# Patient Record
Sex: FEMALE | Race: WHITE | NOT HISPANIC OR LATINO | Employment: STUDENT | URBAN - NONMETROPOLITAN AREA
[De-identification: names, ages, dates, MRNs, and addresses within clinical notes are randomized per-mention and may not be internally consistent; named-entity substitution may affect disease eponyms.]

---
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
## 2022-01-22 ENCOUNTER — HEALTH MAINTENANCE LETTER (OUTPATIENT)
Age: 22
End: 2022-01-22

## 2022-02-17 PROBLEM — K21.9 GASTROESOPHAGEAL REFLUX DISEASE: Status: ACTIVE | Noted: 2020-05-07

## 2022-03-12 NOTE — PATIENT INSTRUCTIONS
"TIPS TO IMPROVE SLEEP    1. Provide a comfortable sleep setting   The bedroom should be comfortable (not too hot or cold), quiet, and dark (although a night light may help children who are afraid of the dark). Cooler (not cold) temperatures encourage quality sleep.    2. Establish a regular bedtime routine   A regular routine that is short and predictable will help to \"set the mood\" that it is now time to sleep. The routine should include calming, quiet activities such as a warm bath, brushing teeth, and reading. Avoid activities such as running, jumping, or rough housing. Avoid exciting movies/games/computers, loud music, or bright lights. The routine should take no more than 30-60 minutes (depending on age). A routine is helpful for all ages, infant to adult.     An example of a bedtime routine:   - Put on pajamas   - Use the toilet   - Wash hands   - Brush teeth   - Drink water   - Read a story/book   - Lie down in bed   - Sleep    *The more regular the routine, the easier it will be to settle at night*    3. Keep a regular schedule of sleep/wake times   Try to keep the same sleep/wake times throughout the week. Try not to sleep in more than an hour later than usual on weekends, to avoid resetting the internal body clock. If it is consistently taking longer than an hour to fall asleep, try moving bedtime later by 30-60 minutes.     4. Avoid heavy meals close to bedtime   A light snack may help with falling asleep, such as cheese and crackers, or herbal tea such as chamomile. Some people are bothered by any food close to bedtime, so avoid any food or drink except for water if this is the case.    5. Keep the bedroom dark at bedtime and light when waking   This helps the body's internal clock to realize when it is time to sleep and time to wake. Use room-darkening shades in the summer at bedtime and turn on the bedroom lights in the winter in the morning.    6. Get exercise daily   Get vigorous exercise early in " "the day (at least 2-3 hours prior to bedtime). Exercise has been shown to make it easier to fall asleep at night and to have deeper sleep. Relaxing activities such as yoga or guided meditation may be done closer to bedtime and may help sleep.    7. Avoid caffeine in the afternoon and evening   Caffeine stays in the system for 3-12 hours. Caffeine may be found in coffee, black/green tea, soda pop, energy drinks, and chocolate. Likewise, avoid high-sugar snacks prior to bed time as the sugar may increase energy.    8. Avoid screens (television, computer, phone, tablet etc) before bed   The light from the screens can be too visually stimulating, even on night shift mode. Playing games or watching movies can be too stimulating for the brain. Turn off all electronics at least 1 hour prior to bedtime.    *Turn all clocks so they are facing away from the bed to avoid clock-watching*    9. Additional therapies for sleep   If it is still difficult to fall asleep, try some of the following strategies:   - Lavender essential oil   - Progressive relaxation exercises   - Counting backwards from 100. If too easy, try counting backwards by 7s or 3s.   - Slow, deep breathing (in for a count of 5 and out for a count of 5)    10. The bed should be a place for sleep and rest only   Especially for older children, if unable to fall asleep after 15-30 minutes, get out of bed and engage in a quiet activity such as reading or meditation. Tossing and turning in bed \"trains\" the body and mind that the bed is a place for tossing and turning, not just sleep.    11. Avoid medication, except as a last resort after all other non-medication therapies have been tried   Any sleep strategy may take a few weeks to work. If sleep is not improving after 2-3 weeks, keep a sleep diary (noting sleep times, wake times, sleep quality, exercise patterns, and food/drink intake) for at least 1-2 weeks and follow up in the clinic.    " Admission

## 2022-03-13 DIAGNOSIS — F41.1 GAD (GENERALIZED ANXIETY DISORDER): ICD-10-CM

## 2022-03-14 DIAGNOSIS — T78.2XXD ANAPHYLACTIC REACTION, SUBSEQUENT ENCOUNTER: ICD-10-CM

## 2022-03-14 RX ORDER — EPINEPHRINE 0.3 MG/.3ML
INJECTION SUBCUTANEOUS
Qty: 2 EACH | Refills: 0 | Status: SHIPPED | OUTPATIENT
Start: 2022-03-14 | End: 2023-11-29

## 2022-03-14 NOTE — TELEPHONE ENCOUNTER
Mother calling and pt is leaving tomorrow out of states.She needs this refilled today.Her pen is .    Epi pen     Last Written Prescription Date:  2020  Last Fill Quantity: 1,   # refills: 0  Last Office Visit: 2021  Future Office visit:       Routing refill request to provider for review/approval because:      Anaya Gonzalez RN

## 2022-04-24 DIAGNOSIS — E28.2 PCOS (POLYCYSTIC OVARIAN SYNDROME): ICD-10-CM

## 2022-04-24 DIAGNOSIS — Z30.41 ENCOUNTER FOR SURVEILLANCE OF CONTRACEPTIVE PILLS: ICD-10-CM

## 2022-04-27 RX ORDER — DROSPIRENONE AND ETHINYL ESTRADIOL 0.03MG-3MG
1 KIT ORAL DAILY
Qty: 84 TABLET | Refills: 3 | Status: SHIPPED | OUTPATIENT
Start: 2022-04-27 | End: 2023-05-08

## 2022-04-27 NOTE — TELEPHONE ENCOUNTER
lov 08/30/2021  drospirenone-ethinyl estradiol (LEATHA) 3-0.03 MG tablet 84 tablet 3 6/29/2021

## 2022-05-04 ENCOUNTER — LAB (OUTPATIENT)
Dept: FAMILY MEDICINE | Facility: OTHER | Age: 22
End: 2022-05-04
Attending: FAMILY MEDICINE
Payer: COMMERCIAL

## 2022-05-04 DIAGNOSIS — Z20.822 SUSPECTED COVID-19 VIRUS INFECTION: ICD-10-CM

## 2022-05-04 LAB — SARS-COV-2 RNA RESP QL NAA+PROBE: NEGATIVE

## 2022-05-04 PROCEDURE — U0003 INFECTIOUS AGENT DETECTION BY NUCLEIC ACID (DNA OR RNA); SEVERE ACUTE RESPIRATORY SYNDROME CORONAVIRUS 2 (SARS-COV-2) (CORONAVIRUS DISEASE [COVID-19]), AMPLIFIED PROBE TECHNIQUE, MAKING USE OF HIGH THROUGHPUT TECHNOLOGIES AS DESCRIBED BY CMS-2020-01-R: HCPCS

## 2022-05-04 PROCEDURE — U0003 INFECTIOUS AGENT DETECTION BY NUCLEIC ACID (DNA OR RNA); SEVERE ACUTE RESPIRATORY SYNDROME CORONAVIRUS 2 (SARS-COV-2) (CORONAVIRUS DISEASE [COVID-19]), AMPLIFIED PROBE TECHNIQUE, MAKING USE OF HIGH THROUGHPUT TECHNOLOGIES AS DESCRIBED BY CMS-2020-01-R: HCPCS | Mod: ZL

## 2022-05-04 PROCEDURE — U0005 INFEC AGEN DETEC AMPLI PROBE: HCPCS

## 2022-05-07 ENCOUNTER — HOSPITAL ENCOUNTER (EMERGENCY)
Facility: HOSPITAL | Age: 22
Discharge: HOME | End: 2022-05-07
Attending: PHYSICIAN ASSISTANT | Admitting: PHYSICIAN ASSISTANT
Payer: COMMERCIAL

## 2022-05-07 VITALS
OXYGEN SATURATION: 98 % | HEART RATE: 112 BPM | RESPIRATION RATE: 16 BRPM | TEMPERATURE: 97.7 F | DIASTOLIC BLOOD PRESSURE: 78 MMHG | SYSTOLIC BLOOD PRESSURE: 113 MMHG

## 2022-05-07 DIAGNOSIS — H69.92 DYSFUNCTION OF LEFT EUSTACHIAN TUBE: ICD-10-CM

## 2022-05-07 DIAGNOSIS — J01.40 ACUTE PANSINUSITIS, RECURRENCE NOT SPECIFIED: ICD-10-CM

## 2022-05-07 DIAGNOSIS — F41.1 GAD (GENERALIZED ANXIETY DISORDER): ICD-10-CM

## 2022-05-07 LAB
FLUAV RNA SPEC QL NAA+PROBE: NEGATIVE
FLUBV RNA RESP QL NAA+PROBE: NEGATIVE
RSV RNA SPEC NAA+PROBE: NEGATIVE
SARS-COV-2 RNA RESP QL NAA+PROBE: NEGATIVE

## 2022-05-07 PROCEDURE — G0463 HOSPITAL OUTPT CLINIC VISIT: HCPCS

## 2022-05-07 PROCEDURE — 87637 SARSCOV2&INF A&B&RSV AMP PRB: CPT | Performed by: PHYSICIAN ASSISTANT

## 2022-05-07 PROCEDURE — 99213 OFFICE O/P EST LOW 20 MIN: CPT | Performed by: PHYSICIAN ASSISTANT

## 2022-05-07 RX ORDER — AZITHROMYCIN 250 MG/1
TABLET, FILM COATED ORAL
Qty: 6 TABLET | Refills: 0 | Status: SHIPPED | OUTPATIENT
Start: 2022-05-07 | End: 2022-05-12

## 2022-05-07 RX ORDER — PREDNISONE 20 MG/1
TABLET ORAL
Qty: 10 TABLET | Refills: 0 | Status: SHIPPED | OUTPATIENT
Start: 2022-05-07 | End: 2022-08-19

## 2022-05-07 ASSESSMENT — ENCOUNTER SYMPTOMS
FEVER: 1
FACIAL SWELLING: 0
SINUS PRESSURE: 1
MYALGIAS: 1
HEADACHES: 1
FATIGUE: 1

## 2022-05-07 NOTE — ED PROVIDER NOTES
History     Chief Complaint   Patient presents with     URI     HPI  Baudilio Pereyra is a 21 year old female who presents on day 4 URI symptoms. She reports facial pressure, post nasal drainage and fatigue are most severe. Slight dry cough with no wheezing. She has been using neti pot, OTC sinus/cold medication with no overall improvement. Multiplex obtained, but is pending. No known ill contacts, but is a student.     Allergies:  Allergies   Allergen Reactions     Pcn [Penicillins]      Peanuts [Nuts]      Seasonal Allergies      Seasonal allergies, dogs, cats, various molds, dust mites, birch trees     Nickel Rash       Problem List:    Patient Active Problem List    Diagnosis Date Noted     Irritable bowel syndrome with constipation 05/07/2020     Priority: Medium     Nausea 05/07/2020     Priority: Medium     Gastroesophageal reflux disease, esophagitis presence not specified 05/07/2020     Priority: Medium     IMO Regulatory Load OCT 2020       Slow transit constipation 02/12/2020     Priority: Medium     Dysuria 02/12/2020     Priority: Medium     Autonomic dysfunction 01/13/2020     Priority: Medium     Hypovitaminosis D 01/13/2020     Priority: Medium     Mood changes 01/13/2020     Priority: Medium     Orthostatic dizziness 10/18/2019     Priority: Medium     Vision changes 10/18/2019     Priority: Medium     Macular rash 04/15/2019     Priority: Medium     Vasovagal syncope 04/15/2019     Priority: Medium     Elevated C-reactive protein (CRP) 04/08/2019     Priority: Medium     Dizziness 04/08/2019     Priority: Medium     Weight gain 06/13/2018     Priority: Medium     Persistent disorder of initiating or maintaining sleep 06/13/2018     Priority: Medium     Chronic fatigue 06/13/2018     Priority: Medium     Patellofemoral disorder of right knee 04/14/2018     Priority: Medium     JAYME (generalized anxiety disorder) 11/21/2016     Priority: Medium     Other acne 11/21/2016     Priority: Medium     PCOS  (polycystic ovarian syndrome) 07/15/2016     Priority: Medium     Mild single current episode of major depressive disorder (H) 06/20/2016     Priority: Medium        Past Medical History:    Past Medical History:   Diagnosis Date     Allergic reaction      Dysmenorrhea 11/21/2016     Fatigue, unspecified type 6/13/2018     JAYME (generalized anxiety disorder)      Patellofemoral disorder of right knee 4/14/2018     PCOS (polycystic ovarian syndrome)      Persistent disorder of initiating or maintaining sleep 6/13/2018     Weight gain 6/13/2018       Past Surgical History:    Past Surgical History:   Procedure Laterality Date     ADENOIDECTOMY       TONSILLECTOMY         Family History:    Family History   Problem Relation Age of Onset     Diabetes Father        Social History:  Marital Status:  Single [1]  Social History     Tobacco Use     Smoking status: Never Smoker     Smokeless tobacco: Never Used   Substance Use Topics     Alcohol use: No     Drug use: No        Medications:    azithromycin (ZITHROMAX Z-ANSELMO) 250 MG tablet  diphenhydrAMINE (BENADRYL) 50 MG capsule  drospirenone-ethinyl estradiol (LEATHA) 3-0.03 MG tablet  EPINEPHrine (ANY BX GENERIC EQUIV) 0.3 MG/0.3ML injection 2-pack  IBUPROFEN PO  lubiprostone (AMITIZA) 24 MCG capsule  multivitamin w/minerals (THERA-VIT-M) tablet  predniSONE (DELTASONE) 20 MG tablet  sertraline (ZOLOFT) 50 MG tablet          Review of Systems   Constitutional: Positive for fatigue and fever.   HENT: Positive for congestion, ear pain, postnasal drip and sinus pressure. Negative for facial swelling.    Musculoskeletal: Positive for myalgias.   Neurological: Positive for headaches.       Physical Exam   BP: 113/78  Pulse: 112  Temp: 97.7  F (36.5  C)  Resp: 16  SpO2: 98 %      Physical Exam  Vitals and nursing note reviewed.   Constitutional:       General: She is not in acute distress.     Appearance: She is not toxic-appearing.   HENT:      Right Ear: Tympanic membrane normal.       Left Ear: A middle ear effusion is present. Tympanic membrane is injected.      Mouth/Throat:      Mouth: Mucous membranes are moist.      Pharynx: No oropharyngeal exudate.   Eyes:      Conjunctiva/sclera: Conjunctivae normal.   Cardiovascular:      Rate and Rhythm: Normal rate and regular rhythm.      Heart sounds: No murmur heard.  Pulmonary:      Effort: Pulmonary effort is normal.      Breath sounds: Normal breath sounds. No wheezing or rales.   Lymphadenopathy:      Cervical: No cervical adenopathy (palpable tender anterior cervical nodes, < 1cm).   Neurological:      Mental Status: She is alert.         ED Course         Assessments & Plan (with Medical Decision Making)     I have reviewed the nursing notes.  I have reviewed the findings, diagnosis, plan and need for follow up with the patient.    Discharge Medication List as of 5/7/2022 10:14 AM      START taking these medications    Details   azithromycin (ZITHROMAX Z-ANSELMO) 250 MG tablet Two tablets on the first day, then one tablet daily for the next 4 days, Disp-6 tablet, R-0, Local Print      predniSONE (DELTASONE) 20 MG tablet Take two tablets (= 40mg) each day for 5 (five) days, Disp-10 tablet, R-0, E-Prescribe             Final diagnoses:   Acute pansinusitis, recurrence not specified   Dysfunction of left eustachian tube   Will treat with prednisone as above and watch/wait with antibiotics for OM. Continue sinus flush, gargles and fluids. Follow up with Primary Care Provider in 3-5 days with poor improvement. Seek attention sooner with worsening despite treatment.      5/7/2022   [facility]     David Israel PA  05/07/22 [id number]

## 2022-05-07 NOTE — DISCHARGE INSTRUCTIONS
1. Dry out congestion/swelling/secretions with prednisone (2 tabs now, then in AM with food)   2. Use a saline spray/Neti Pot/sinus flush (Messi Med Sinus Rinse) 2-3 times daily to irrigate sinuses/mucosal tissue. This dilutes and moves secretions.   3. Tylenol or ibuprofen for pain and fevers as needed.   4. Plenty of fluids and rest as needed.   5. Chew, yawn and speak to help eustachian tubes drain.   * If ear pain worsening, may start Zpack.   - Consider the following over-the-counter products if you are older than 1 year and not pregnant: honey/chestal for cough relief and sambucus/elderberry for viral upper-respiratory symptoms.

## 2022-05-18 RX ORDER — AZITHROMYCIN 250 MG/1
TABLET, FILM COATED ORAL
Qty: 6 TABLET | Refills: 0 | Status: SHIPPED | OUTPATIENT
Start: 2022-05-18 | End: 2022-05-23

## 2022-07-12 ENCOUNTER — TRANSFERRED RECORDS (OUTPATIENT)
Dept: HEALTH INFORMATION MANAGEMENT | Facility: HOSPITAL | Age: 22
End: 2022-07-12

## 2022-07-12 LAB
C TRACH DNA SPEC QL PROBE+SIG AMP: NOT DETECTED
HEP C HIM: NORMAL
HIV 1&2 EXT: NORMAL
SPECIMEN DESCRIPTION: NORMAL

## 2022-08-01 DIAGNOSIS — F41.1 GAD (GENERALIZED ANXIETY DISORDER): ICD-10-CM

## 2022-08-02 NOTE — TELEPHONE ENCOUNTER
zoloft      Last Written Prescription Date:  5/7/22  Last Fill Quantity: 30,   # refills: 0  Last Office Visit: 4/15/21  Future Office visit:

## 2022-08-17 ENCOUNTER — TELEPHONE (OUTPATIENT)
Dept: FAMILY MEDICINE | Facility: OTHER | Age: 22
End: 2022-08-17

## 2022-08-17 NOTE — TELEPHONE ENCOUNTER
"Call placed to patient at [phone] as provided by patient when requesting a return call from Triage.     Voicemail message left requesting a return call to discuss positive covid 19, Triage # provided, awaiting return call.     Patient testing positive on 8/16/22 via at home covid test.    Symptoms to include:      Start of Symptoms:     Covid 19 Vaccination Status:      Underlying Medical Conditions: Anxiety     Appointment scheduled:     Instructions below provided to patient. Patient verbalized understanding.     Instructions for Patients  Your COVID-19 test was positive. This means you have the virus. Please follow the \"How can I take care of myself\" and \"How do I self-isolate?\" guidelines in these instructions.    What treatments are available?  Over-the-counter medicines may help with your symptoms such as runny or stuffy nose, cough, chills, and fever. Talk to your care team about your options.     Some people are at high risk for severe illness (for example if you have a weak immune system, you're 65 or older, or you have certain medical problems). If your risk it high and your symptoms started in the last 5 to 7 days, we strongly recommend for you to get COVID treatment as soon as possible before you get really sick. Paxlovid, Molnupiravir and the monoclonal antibody treatments are proven safe and effective, make you feel better faster, and prevent hospitalization and death.       What are the symptoms of COVID-19?  Symptoms can include fever, cough, shortness of breath, chills, headache, muscle pain sore throat, fatigue, runny or stuffy nose, and loss of taste and smell. Other less common symptoms include nausea, vomiting, or diarrhea (watery stools).    Know when to call 911. Emergency warning signs include:    Trouble breathing or shortness of breath    Pain or pressure in the chest that doesn't go away    Feeling confused like you haven't felt before, or not being able to wake up    Bluish-colored " lips or face    How can I take care of myself?  1. Get lots of rest. Drink extra fluids (unless a doctor has told you not to).  2. Take Tylenol (acetaminophen) for fever or pain. If you have liver or kidney problems, ask your family doctor if it's okay to take Tylenol   Adults:   650 mg (two 325 mg pills or tablets) every 4 to 6 hours, or...   1,000 mg (two 500 mg pills or tablets) every 8 hours as needed.  Note: Don't take more than 3,000 mg in one day. Acetaminophen is found in many medicines (both prescribed and over the counter). Read all labels to be sure you don't take too much.  For children, check the Tylenol bottle for the right dose. The dose is based on the child's age or weight.  3. Take over the counter medicines for your symptoms as needed. Talk to your pharmacist.  4. If you have other health problems (like cancer, heart failure, an organ transplant, or severe kidney disease): Call your specialty clinic if you don't feel better in the next 2 days.    These guidelines are for isolating and quarantining before returning to work, school or .     For employers, schools and day cares: This is an official notice for this person's medical guidelines for returning in-person.     For health care sites: The CDC gives different isolation and quarantine guidelines for healthcare sites, please check with these sites before arriving.     How do I self-isolate?  You isolate when you have symptoms of COVID or a test shows you have COVID, even if you don't have symptoms.     If you DO have symptoms:  o Stay home and away from others  - For at least 5 days after your symptoms started, AND   - You are fever free for 24 hours (with no medicine that reduces fever), AND  - Your other symptoms are better.  o Wear a mask for 10 full days any time you are around others.    If you DON'T have symptoms:  o Stay at home and away from others for at least 5 days after your positive test.  o Wear a mask for 10 full days any  time you are around others.    How and when do I quarantine?  You quarantine when you may have been exposed to the virus and DON'T have symptoms.     Stay home and away from others.     You must quarantine for 5 days after your last contact with a person who has COVID.  o Note: If you are fully vaccinated, you don't need to quarantine. You should still follow the steps below.     Wear a mask for 10 full days any time you're around others.    Get tested at least 5 days after you were exposed, even if you don't have symptoms.     If you start to have symptoms, isolate right away and get tested.    Where can I get more information?    [facility] COVID-19 Resource Hub: [email or web address]     [facility] Quarantine & Isolation: https://www.cdc.gov/coronavirus/2019-ncov/your-health/quarantine-isolation.html     CDC - What to Do If You're Sick: https://www.cdc.gov/coronavirus/2019-ncov/if-you-are-sick/index.html    [facility] clinical trials (COVID-19 research studies): clinicalaffairs.[facility]/umn-clinical-trials    [place] Department of Health COVID-19 Public Hotline: 1-845.915.3387

## 2022-08-17 NOTE — TELEPHONE ENCOUNTER
Writer spoke with patients mom regarding pt's positive covid test & symptoms.  Mom relayed symptoms started approx 8 days ago.  Pt had a covid positive test 8/16/22  Writer relayed patient would not qualify for anitviral tx d/t SSD outside of 5 day window.  Writer & mom discussed treating symptoms:  Recommended to discuss with pharmacist prior to admin to patient  Nausea:  OTC dramamine or generic equivalent  HA & body aches: alternate acetaminophen/ibruprofen    Increase rest  Maintain adequate food & fluid intake  Reviewed CDC guidelines for testing covid positive  Mom relayed understanding   No further concerns at calls end.

## 2022-08-18 NOTE — PROGRESS NOTES
Baudilio is a 21 year old who is being evaluated via a billable video visit.      How would you like to obtain your AVS? MyChart  If the video visit is dropped, the invitation should be resent by: Text to cell phone: [phone]  Will anyone else be joining your video visit? 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 [facility]  Take vitamin c daily and restart mvi, if she doesn't tolerate, trial flinstone at bedtime first  If she continues to be ill, follow-up in January for testing  Note given for work    Mild single current episode of major depressive disorder (H)  Doing well, continues zoloft, refilled    Abnormality of immune system  Second to covid, stress and 1st yr in healthcare environment    (Z11.59) Encounter for screening for other viral diseases  Comment:   Plan: HIV Antigen Antibody Combo, Hepatitis C Screen         Reflex to HCV RNA Quant and Genotype,         GC/Chlamydia by PCR - [facility]          (Z71.89) ACP (advance care planning)  Comment:   Plan: not discussed today        Provider  Link to [facility] Help Grid :[id number]}    30 minutes spent on the date of the encounter doing chart review, history and exam, documentation and further activities per the note    Patient was agreeable to this plan and had no further questions.  There are no Patient Instructions on file for this visit.    No follow-ups on file.    Patricia Lizama MD  [facility] - [facility]    Subjective   Baudilio is a 21 year old, presenting for the following health issues:  No chief complaint on file.      HPI       COVID-19 Symptom Review  How many days ago did these symptoms start? 8 days. 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   Are any of the following symptoms significant for you?    New or worsening difficulty breathing? No    Worsening cough? No    Fever or chills? No    Headache: YES- on and off    Sore throat: YES    Chest pain: No    Diarrhea: No    Body aches? No    What treatments has patient tried? Acetaminophen   Does patient live in a nursing home, group home, or shelter? No  Does patient have a way to get food/medications during quarantined? Yes, I have a friend or family member who can help me.    Depression and Anxiety Follow-Up    How are you doing with your depression since your last visit? Improved stable    How are you doing with your anxiety since your last visit?  Improved stable    Are you having other symptoms that might be associated with depression or anxiety? No    Have you had a significant life event? Health Concerns     Do you have any concerns with your use of alcohol or other drugs? No    Social History     Tobacco Use     Smoking status: Never Smoker     Smokeless tobacco: Never Used   Substance Use Topics     Alcohol use: No     Drug use: No     PHQ 10/18/2019 7/13/2020 2/9/2021   PHQ-9 Total Score - - 0   Q9: Thoughts of better off dead/self-harm past 2 weeks - - Not at all   PHQ-A Total Score 2 0 -   PHQ-A Depressed most days in past year No No -   PHQ-A Mood affect on daily activities Not difficult at all Not difficult at all -   PHQ-A Suicide Ideation past 2 weeks Not at all Not at all -   PHQ-A Suicide Ideation past month No No -   PHQ-A Previous suicide attempt No No -     JAYME-7 SCORE 10/18/2019 7/13/2020 2/9/2021   Total Score 0 0 0     Last PHQ-9 2/9/2021   1.  Little interest or pleasure in doing things 0   2.  Feeling down, depressed, or hopeless 0   3.  Trouble falling or staying asleep, or sleeping too much 0   4.  Feeling tired or having little energy 0   5.  Poor appetite or overeating 0   6.  Feeling bad about yourself 0   7.  Trouble concentrating 0   8.  Moving slowly or  restless 0   Q9: Thoughts of better off dead/self-harm past 2 weeks 0   PHQ-9 Total Score 0   Difficulty at work, home, or with people -     JAYME-7  2/9/2021   1. Feeling nervous, anxious, or on edge 0   2. Not being able to stop or control worrying 0   3. Worrying too much about different things 0   4. Trouble relaxing 0   5. Being so restless that it is hard to sit still 0   6. Becoming easily annoyed or irritable 0   7. Feeling afraid, as if something awful might happen 0   JAYME-7 Total Score 0   If you checked any problems, how difficult have they made it for you to do your work, take care of things at home, or get along with other people? -       Suicide Assessment Five-step Evaluation and Treatment (SAFE-T)      How many servings of fruits and vegetables do you eat daily?  2-3    On average, how many sweetened beverages do you drink each day (Examples: soda, juice, sweet tea, etc.  Do NOT count diet or artificially sweetened beverages)?   1    How many days per week do you exercise enough to make your heart beat faster? 3 or less    How many minutes a day do you exercise enough to make your heart beat faster? 20 - 29  How many days per week do you miss taking your medication? 1    What makes it hard for you to take your medications?  remembering to take      Review of Systems   Constitutional, HEENT, cardiovascular, pulmonary, gi and gu systems are negative, except as otherwise noted.      Objective           Vitals:  No vitals were obtained today due to virtual visit.    Physical Exam   GENERAL: Healthy, alert and no distress  EYES: Eyes grossly normal to inspection.  No discharge or erythema, or obvious scleral/conjunctival abnormalities.  RESP: No audible wheeze, cough, or visible cyanosis.  No visible retractions or increased work of breathing.    SKIN: Visible skin clear. No significant rash, abnormal pigmentation or lesions.  NEURO: Cranial nerves grossly intact.  Mentation and speech appropriate for  age.  PSYCH: Mentation appears normal, affect normal/bright, judgement and insight intact, normal speech and appearance well-groomed.    No results found for any visits on 08/19/22.        Video-Visit Details    Video Start Time: 1152    Type of service:  Video Visit    Video End Time:1209    Originating Location (pt. Location): Home    Distant Location (provider location):  [facility]     Platform used for Video Visit: Emerson Chadwick  ..

## 2022-08-19 ENCOUNTER — VIRTUAL VISIT (OUTPATIENT)
Dept: FAMILY MEDICINE | Facility: OTHER | Age: 22
End: 2022-08-19
Attending: FAMILY MEDICINE
Payer: COMMERCIAL

## 2022-08-19 ENCOUNTER — TELEPHONE (OUTPATIENT)
Dept: FAMILY MEDICINE | Facility: OTHER | Age: 22
End: 2022-08-19

## 2022-08-19 DIAGNOSIS — F32.0 MILD SINGLE CURRENT EPISODE OF MAJOR DEPRESSIVE DISORDER (H): ICD-10-CM

## 2022-08-19 DIAGNOSIS — U07.1 INFECTION DUE TO 2019 NOVEL CORONAVIRUS: Primary | ICD-10-CM

## 2022-08-19 DIAGNOSIS — Z11.59 ENCOUNTER FOR SCREENING FOR OTHER VIRAL DISEASES: ICD-10-CM

## 2022-08-19 DIAGNOSIS — F41.1 GAD (GENERALIZED ANXIETY DISORDER): ICD-10-CM

## 2022-08-19 DIAGNOSIS — R89.4 ABNORMALITY OF IMMUNE SYSTEM: ICD-10-CM

## 2022-08-19 DIAGNOSIS — Z71.89 ACP (ADVANCE CARE PLANNING): ICD-10-CM

## 2022-08-19 PROCEDURE — 99214 OFFICE O/P EST MOD 30 MIN: CPT | Mod: 95 | Performed by: FAMILY MEDICINE

## 2022-08-19 RX ORDER — ONDANSETRON 4 MG/1
TABLET, FILM COATED ORAL
COMMUNITY
Start: 2022-04-21

## 2022-08-19 NOTE — LETTER
August 19, 2022      Baudilio JOSE Pereyra  [address] [facility] [address]        To Whom It May Concern:    Baudilio Pereyra  was seen on 8/19/22.  Please excuse her  until 8/21/22 due to illness.        Sincerely,        Patricia Lizama MD

## 2022-08-19 NOTE — TELEPHONE ENCOUNTER
Mother calling stating patient needs note for work that she can return to work on Monday 08/22/22.  They forgot to ask at visit. They are leaving out of town so need this ASAP. Please call and let them know they can pick it up.could this be emailed to her. Please call patient to see what way she wants this letter.Contact Baudilio who is patient with what to do instead of mother.

## 2022-08-19 NOTE — TELEPHONE ENCOUNTER
Attempt # 1  Outcome: Talked with Patient    Comment: called and notified pt they can come at their convenience and  letter at reg desk

## 2022-09-04 ENCOUNTER — HEALTH MAINTENANCE LETTER (OUTPATIENT)
Age: 22
End: 2022-09-04

## 2023-02-24 DIAGNOSIS — F41.1 GAD (GENERALIZED ANXIETY DISORDER): ICD-10-CM

## 2023-02-24 NOTE — TELEPHONE ENCOUNTER
Sertraline (Zoloft) 50 MG tablet    Last Written Prescription Date:  08/19/2022  Last Fill Quantity: 90,   # refills: 1  Last Office Visit: 8/19/2022 virtual  Future Office visit:

## 2023-04-29 ENCOUNTER — HEALTH MAINTENANCE LETTER (OUTPATIENT)
Age: 23
End: 2023-04-29

## 2023-05-06 DIAGNOSIS — Z30.41 ENCOUNTER FOR SURVEILLANCE OF CONTRACEPTIVE PILLS: ICD-10-CM

## 2023-05-06 DIAGNOSIS — E28.2 PCOS (POLYCYSTIC OVARIAN SYNDROME): ICD-10-CM

## 2023-05-08 RX ORDER — DROSPIRENONE AND ETHINYL ESTRADIOL 0.03MG-3MG
1 KIT ORAL DAILY
Qty: 84 TABLET | Refills: 3 | Status: SHIPPED | OUTPATIENT
Start: 2023-05-08 | End: 2024-04-22

## 2023-11-22 DIAGNOSIS — T78.2XXD ANAPHYLACTIC REACTION, SUBSEQUENT ENCOUNTER: ICD-10-CM

## 2023-11-28 NOTE — TELEPHONE ENCOUNTER
Epi Pen       Last Written Prescription Date:  3/14/2022  Last Fill Quantity: 2,   # refills: 0  Last Office Visit: 8/19/2022  Future Office visit:

## 2023-11-29 RX ORDER — EPINEPHRINE 0.3 MG/.3ML
INJECTION SUBCUTANEOUS
Qty: 1 EACH | Refills: 0 | Status: SHIPPED | OUTPATIENT
Start: 2023-11-29 | End: 2024-05-28

## 2024-01-09 DIAGNOSIS — F41.1 GAD (GENERALIZED ANXIETY DISORDER): ICD-10-CM

## 2024-01-09 NOTE — LETTER
January 10, 2024      Baudilio Pereyra  [address] [facility] [address]        Dear Baudilio,     APPOINTMENT REMINDER:   Our records indicates that it is time for you to be seen for an annual visit and medication review.      Your current medication request will be approved for one refill but you will need to be seen before any additional refills can be approved.  Taking care of your health is important to us, and ongoing visits with your provider are vital to your care.    We look forward to seeing you in the near future.  You may call our office at [phone] to schedule a visit.     Please disregard this notice if you have already made an appointment.       Sincerely,        Patricia Lizama MD

## 2024-01-10 ENCOUNTER — TELEPHONE (OUTPATIENT)
Dept: FAMILY MEDICINE | Facility: OTHER | Age: 24
End: 2024-01-10

## 2024-01-10 NOTE — TELEPHONE ENCOUNTER
Attempt # 2  Outcome: Left Message   Comment: lvm to call back to schedule mental health follow up with pcp

## 2024-01-10 NOTE — TELEPHONE ENCOUNTER
Patient's mom called requesting refill for patient. Reports patient is going out of state tomorrow morning and is requesting refill today.   Writer unable to schedule future appt with PCP due to speaking with patient's mom and not patient.   Writer attempted to call patient. No answer. LM for patient to return call.   Letter sent to patient to schedule future appt.   Please advise, thank you.     sertraline (ZOLOFT) 50 MG tablet       Last Written Prescription Date:  3/1/23  Last Fill Quantity: 90,   # refills: 1  Last Office Visit: 8/19/22 virtual visit  Future Office visit:       Routing refill request to provider for review/approval because:    [facility] Protocol [id number]/09/2024 10:34 PM   Protocol Details Recent (12 mo) or future (30 days) visit within the authorizing provider's specialty

## 2024-01-10 NOTE — TELEPHONE ENCOUNTER
----- Message from Malcolm Hatch MD sent at 1/10/2024 10:46 AM CST -----  Needs follow-up appt with PCP in next several months on depression /teresa

## 2024-03-11 NOTE — TELEPHONE ENCOUNTER
Call from patient's Mother, Roxana. Patient was in to Urgent Care on 5/14/20 notified referring to Gastoenterology. Roxana was notified if she does not hear back by end of day 5/15/20 with appointment to follow up. Roxana has not received a call back.     Please contact patient Roxana [phone].  
[facility] doesn't have gastro, referral sent to [facility]. Notified patient.  
Referrals are slow -- please [place] [facility]    
Attending Attestation (For Attendings USE Only)...

## 2024-04-02 ENCOUNTER — TRANSFERRED RECORDS (OUTPATIENT)
Dept: HEALTH INFORMATION MANAGEMENT | Facility: HOSPITAL | Age: 24
End: 2024-04-02

## 2024-04-02 LAB
C TRACH DNA SPEC QL PROBE+SIG AMP: NOT DETECTED
HEP C HIM: NORMAL
HIV 1&2 EXT: NORMAL
N GONORRHOEA DNA SPEC QL PROBE+SIG AMP: NOT DETECTED
PAP-ABSTRACT: NORMAL
SPECIMEN DESCRIP: NORMAL
SPECIMEN DESCRIPTION: NORMAL

## 2024-04-18 DIAGNOSIS — F41.1 GAD (GENERALIZED ANXIETY DISORDER): ICD-10-CM

## 2024-04-18 NOTE — TELEPHONE ENCOUNTER
Please call patient and schedule appt.   Thank you.     Patricia Lizama MD   to Crystal Garcia LPN   Family Care Team 2   KB      4/18/24  1:35 PM  Overdue for appt and nothing scheduled

## 2024-04-18 NOTE — TELEPHONE ENCOUNTER
Zoloft      Last Written Prescription Date:  1/10/24  Last Fill Quantity: 90,   # refills: 0  Last Office Visit: 8/19/22  Future Office visit:       Routing refill request to provider for review/approval because:

## 2024-04-19 DIAGNOSIS — E28.2 PCOS (POLYCYSTIC OVARIAN SYNDROME): ICD-10-CM

## 2024-04-19 DIAGNOSIS — Z30.41 ENCOUNTER FOR SURVEILLANCE OF CONTRACEPTIVE PILLS: ICD-10-CM

## 2024-04-19 NOTE — TELEPHONE ENCOUNTER
Aida  Last Written Prescription Date: 5/8/23  Last Fill Quantity: 84 # of Refills: 3  Last Office Visit: 8/19/22

## 2024-04-22 ENCOUNTER — TELEPHONE (OUTPATIENT)
Dept: FAMILY MEDICINE | Facility: OTHER | Age: 24
End: 2024-04-22

## 2024-04-22 RX ORDER — DROSPIRENONE AND ETHINYL ESTRADIOL 0.03MG-3MG
1 KIT ORAL DAILY
Qty: 84 TABLET | Refills: 0 | Status: SHIPPED | OUTPATIENT
Start: 2024-04-22 | End: 2024-07-15

## 2024-04-22 NOTE — TELEPHONE ENCOUNTER
----- Message from Malcolm Hatch MD sent at 4/22/2024 10:17 AM CDT -----  Needs physical /pap with pcp or someone in next 3 months for to continue Birth control renewal. 3 months given

## 2024-04-24 NOTE — TELEPHONE ENCOUNTER
10:02 AM    Reason for Call: Phone Call    Description: patient states that she now lives in [place] long term and would like to schedule phone call visit (preferred) / video visit (if necessary) for med review of sertraline. Sending back for approval from Dl to schedule phone call visit.     Was an appointment offered for this call? No  If yes : Appointment type              Date    Preferred method for responding to this message: Telephone Call  What is your phone number? [phone]    If we cannot reach you directly, may we leave a detailed response at the number you provided? Yes    Can this message wait until your PCP/provider returns, if available today? Not applicable    Diya Gaines

## 2024-04-24 NOTE — TELEPHONE ENCOUNTER
Pt has kade't with PCP on 5/30/24 for Physical  Writer pended Sertraline 50 mg, quantity of 35 to cover Patient until this kade't    Verified with Patient:  She has two sertraline tabs left  Pap completed 4/2/24 @ [facility]  She is agreeable to keeping kade't for physical on 5/30/24    Pended med request to PCP to review

## 2024-05-27 DIAGNOSIS — F41.1 GAD (GENERALIZED ANXIETY DISORDER): ICD-10-CM

## 2024-05-27 DIAGNOSIS — T78.2XXD ANAPHYLACTIC REACTION, SUBSEQUENT ENCOUNTER: ICD-10-CM

## 2024-05-28 ENCOUNTER — TELEPHONE (OUTPATIENT)
Dept: FAMILY MEDICINE | Facility: OTHER | Age: 24
End: 2024-05-28

## 2024-05-28 NOTE — TELEPHONE ENCOUNTER
Reason for call:  Medication    Pt mother called for these medications. Mother scheduled pt for Dr. Lizama's next available. Requesting a refill until pt can be seen.   Have you contacted your pharmacy? Yes   If patient has contacted Pharmacy and it has been over 72hrs, continue to #2  Medication sertraline (ZOLOFT) 50 MG tablet and EPINEPHrine (ANY BX GENERIC EQUIV) 0.3 MG/0.3ML injection 2-pack   What Pharmacy do you use? [facility] [address] [place]      (Please note that the turn-around-time for prescriptions is 72 business hours; I am sending your request at this time. SEND TO appropriate Care Team Pool )

## 2024-05-28 NOTE — TELEPHONE ENCOUNTER
epi      Last Written Prescription Date:  11/29/23  Last Fill Quantity: 1,   # refills: 0  Last Office Visit: 8/19/22  Future Office visit:    Next 5 appointments (look out 90 days)      May 30, 2024  1:15 PM  (Arrive by 1:00 PM)  [facility] [place] [facility] [place] [facility] [address]  [place] [address]  [phone]     Jul 31, 2024  3:15 PM  (Arrive by 3:00 PM)  [facility] [place] [facility] [place] [facility] [address]  [place] [address]  [phone]             Routing refill request to provider for review/approval because:    zoloft      Last Written Prescription Date:  4/24/24  Last Fill Quantity: 35,   # refills: 0  Last Office Visit: 8/19/22  Future Office visit:    Next 5 appointments (look out 90 days)      May 30, 2024  1:15 PM  (Arrive by 1:00 PM)  [facility] [place] [facility] [place] [facility] [address]  [place] [address]  [phone]     Jul 31, 2024  3:15 PM  (Arrive by 3:00 PM)  Provider Visit with Patricia Lizama MD  [facility] [place] ([facility] [place] ) [address]  [place] [address]  [phone]             Routing refill request to provider for review/approval because:

## 2024-05-29 RX ORDER — EPINEPHRINE 0.3 MG/.3ML
INJECTION SUBCUTANEOUS
Qty: 1 EACH | Refills: 0 | Status: SHIPPED | OUTPATIENT
Start: 2024-05-29

## 2024-05-29 NOTE — TELEPHONE ENCOUNTER
Reason for call:  Medication      Have you contacted your pharmacy? Yes   If patient has contacted Pharmacy and it has been over 72hrs, continue to #2  Medication Epi Pen / Patient mother stated Baudilio will be going out of town today needs this medication refilled today  What Pharmacy do you use? Mother hung up phone before this writer could get this information.      (Please note that the turn-around-time for prescriptions is 72 business hours; I am sending your request at this time. SEND TO appropriate Care Team Pool )

## 2024-07-06 ENCOUNTER — HEALTH MAINTENANCE LETTER (OUTPATIENT)
Age: 24
End: 2024-07-06

## 2024-07-07 DIAGNOSIS — F41.1 GAD (GENERALIZED ANXIETY DISORDER): ICD-10-CM

## 2024-07-08 NOTE — TELEPHONE ENCOUNTER
Zoloft       Last Written Prescription Date:  5/29/2024  Last Fill Quantity: 35,   # refills: 0  Last Office Visit: 8/19/2022  Future Office visit:    Next 5 appointments (look out 90 days)      Jul 31, 2024 3:15 PM  (Arrive by 3:00 PM)  Provider Visit with Patricia Lizama MD  [facility] [place] ([facility] [place] ) [address]  [place] [address]  [phone]

## 2024-07-12 DIAGNOSIS — Z30.41 ENCOUNTER FOR SURVEILLANCE OF CONTRACEPTIVE PILLS: ICD-10-CM

## 2024-07-12 DIAGNOSIS — E28.2 PCOS (POLYCYSTIC OVARIAN SYNDROME): ICD-10-CM

## 2024-07-12 NOTE — TELEPHONE ENCOUNTER
Aida       Last Written Prescription Date:  4/22/2024  Last Fill Quantity: 84,   # refills: 0  Last Office Visit: 4/15/2021  Future Office visit:    Next 5 appointments (look out 90 days)      Jul 31, 2024 3:15 PM  (Arrive by 3:00 PM)  Provider Visit with Patricia Lizama MD  [facility] [place] ([facility] [place] ) [address]  [place] [address]  [phone]

## 2024-07-15 RX ORDER — DROSPIRENONE AND ETHINYL ESTRADIOL 0.03MG-3MG
1 KIT ORAL DAILY
Qty: 84 TABLET | Refills: 3 | Status: SHIPPED | OUTPATIENT
Start: 2024-07-15

## 2024-08-06 ENCOUNTER — TELEPHONE (OUTPATIENT)
Dept: CARE COORDINATION | Facility: OTHER | Age: 24
End: 2024-08-06

## 2025-07-13 ENCOUNTER — HEALTH MAINTENANCE LETTER (OUTPATIENT)
Age: 25
End: 2025-07-13